# Patient Record
Sex: FEMALE | Race: WHITE | Employment: FULL TIME | URBAN - METROPOLITAN AREA
[De-identification: names, ages, dates, MRNs, and addresses within clinical notes are randomized per-mention and may not be internally consistent; named-entity substitution may affect disease eponyms.]

---

## 2019-12-21 ENCOUNTER — OFFICE VISIT (OUTPATIENT)
Dept: URGENT CARE | Facility: URGENT CARE | Age: 59
End: 2019-12-21
Payer: COMMERCIAL

## 2019-12-21 VITALS
WEIGHT: 150 LBS | OXYGEN SATURATION: 98 % | DIASTOLIC BLOOD PRESSURE: 86 MMHG | HEART RATE: 95 BPM | TEMPERATURE: 98.3 F | SYSTOLIC BLOOD PRESSURE: 150 MMHG

## 2019-12-21 DIAGNOSIS — S61.210A LACERATION OF RIGHT INDEX FINGER WITHOUT FOREIGN BODY, NAIL DAMAGE STATUS UNSPECIFIED, INITIAL ENCOUNTER: Primary | ICD-10-CM

## 2019-12-21 DIAGNOSIS — Z23 NEED FOR TDAP VACCINATION: ICD-10-CM

## 2019-12-21 PROCEDURE — 90471 IMMUNIZATION ADMIN: CPT | Performed by: FAMILY MEDICINE

## 2019-12-21 PROCEDURE — 90715 TDAP VACCINE 7 YRS/> IM: CPT | Performed by: FAMILY MEDICINE

## 2019-12-21 PROCEDURE — 99202 OFFICE O/P NEW SF 15 MIN: CPT | Mod: 25 | Performed by: FAMILY MEDICINE

## 2019-12-21 ASSESSMENT — PAIN SCALES - GENERAL: PAINLEVEL: MILD PAIN (2)

## 2019-12-21 NOTE — PROGRESS NOTES
SUBJECTIVE:   59 year old female sustained laceration of right index finger distally this evening. Nature of injury: cut with mandoline slicer. Tetanus vaccination status reviewed: tetanus status unknown to the patient.     OBJECTIVE:   Patient appears well, vitals are normal. Right index finger chopped skin distally, about 0.8 cm, oozing some blood continuously. Neurovascular and tendon structures are intact.    ASSESSMENT:     ICD-10-CM    1. Laceration of right index finger without foreign body, nail damage status unspecified, initial encounter S61.210A    2. Need for Tdap vaccination Z23 VACCINE ADMINISTRATION, INITIAL     TDAP VACCINE       PLAN:   Bleeding stopped with the help of silver nitrate and Gelfoam successfully, appropriate dressing applied.  Risk of infection explained.  Follow-up with PCP early next week.  Tdap vaccine administered in office today.  All questions answered.        [facility]

## 2019-12-22 NOTE — PATIENT INSTRUCTIONS
Patient Education     Gelfoam Dressing Change    Gelfoam is a material used in fresh open wounds to stop bleeding. The Gelfoam is put directly on the base of the wound. It helps the blood to form a clot. Another bandage is put on top of the Gelfoam to protect it and keep it in place.   The Gelfoam material that touches the wound base will dissolve or fall off with the scab. Any Gelfoam that s left may be taken off during a follow-up visit.  Home care  The following guidelines will help you care for your wound at home:  Dressing care  Keep the dressing dry until the next dressing change or visit with your healthcare provider. Bathe with your dressing out of the water, protected with a large, rubber-banded plastic bag. Be careful that the rubber bands are not too tight, cutting off circulation. If the dressing becomes wet, it will need to be changed.  Changing the dressing  If you were advised to change the dressing at home:    Wash your hands.    Remove the outer bandage covering the Gelfoam.    The outer bandage might stick to the Gelfoam because of blood in the bandage. If that happens, run warm water over the dressing until the dried blood softens and you can peel the dressing away from the Gelfoam. Be careful not to pull the Gelfoam off the wound.     If the warm water method alone does not work to loosen the bandage, you may pour hydrogen peroxide over the dressing. This will help soften the dried blood.     If this doesn t work and you are having difficulty, return to this facility and let us replace the dressing for you.    After you have removed the bandage, rinse the wound area with soap and water. Look at the area around the wound for redness, swelling, or pus.      Put an antibiotic ointment over the Gelfoam to keep it from sticking to the new bandage. Put on another bandage or large adhesive bandage.  Other precautions    No tub baths or swimming until the bandage is removed and the wound is healed.  This will take at least 7 days.    If you were given an appointment for wound check or dressing change, be sure to keep this appointment.    Follow-up care  Follow up with your healthcare provider. Most open wounds heal in 10 to 14 days. But even with proper treatment, a wound infection may sometimes occur. Be sure to check the wound every day for any signs of infection listed below.  When to seek medical advice  Call your healthcare provider right away if any of these occur:    Pain in the wound gets worse    Redness, swelling, or pus coming from the wound    Fever of 100.4 F (38 C) or higher, or as directed by your healthcare provider    Bleeding that can t be controlled by putting direct pressure on the wound  Date Last Reviewed: 10/1/2016    [id number] [employer]. [address] [place] [address], [place] [address]. All rights reserved. This information is not intended as a substitute for professional medical care. Always follow your healthcare professional's instructions.